# Patient Record
Sex: MALE | Race: OTHER | HISPANIC OR LATINO | URBAN - METROPOLITAN AREA
[De-identification: names, ages, dates, MRNs, and addresses within clinical notes are randomized per-mention and may not be internally consistent; named-entity substitution may affect disease eponyms.]

---

## 2021-05-24 ENCOUNTER — HOSPITAL ENCOUNTER (EMERGENCY)
Facility: HOSPITAL | Age: 56
Discharge: HOME | End: 2021-05-24
Attending: EMERGENCY MEDICINE

## 2021-05-24 ENCOUNTER — APPOINTMENT (EMERGENCY)
Dept: RADIOLOGY | Facility: HOSPITAL | Age: 56
End: 2021-05-24

## 2021-05-24 VITALS
WEIGHT: 300.71 LBS | SYSTOLIC BLOOD PRESSURE: 182 MMHG | OXYGEN SATURATION: 99 % | DIASTOLIC BLOOD PRESSURE: 105 MMHG | RESPIRATION RATE: 14 BRPM | TEMPERATURE: 98.4 F | HEART RATE: 69 BPM

## 2021-05-24 DIAGNOSIS — R03.0 ELEVATED BLOOD PRESSURE READING: ICD-10-CM

## 2021-05-24 DIAGNOSIS — L60.0 INGROWN TOENAIL: Primary | ICD-10-CM

## 2021-05-24 LAB
ANION GAP SERPL CALCULATED.3IONS-SCNC: 10 MMOL/L (ref 4–13)
BASOPHILS # BLD AUTO: 0.03 THOUSANDS/ΜL (ref 0–0.1)
BASOPHILS NFR BLD AUTO: 1 % (ref 0–1)
BUN SERPL-MCNC: 16 MG/DL (ref 5–25)
CALCIUM SERPL-MCNC: 9 MG/DL (ref 8.3–10.1)
CHLORIDE SERPL-SCNC: 103 MMOL/L (ref 100–108)
CO2 SERPL-SCNC: 28 MMOL/L (ref 21–32)
CREAT SERPL-MCNC: 0.78 MG/DL (ref 0.6–1.3)
EOSINOPHIL # BLD AUTO: 0.11 THOUSAND/ΜL (ref 0–0.61)
EOSINOPHIL NFR BLD AUTO: 2 % (ref 0–6)
ERYTHROCYTE [DISTWIDTH] IN BLOOD BY AUTOMATED COUNT: 13.2 % (ref 11.6–15.1)
GFR SERPL CREATININE-BSD FRML MDRD: 100 ML/MIN/1.73SQ M
GLUCOSE SERPL-MCNC: 114 MG/DL (ref 65–140)
HCT VFR BLD AUTO: 40.9 % (ref 36.5–49.3)
HGB BLD-MCNC: 13.3 G/DL (ref 12–17)
IMM GRANULOCYTES # BLD AUTO: 0.02 THOUSAND/UL (ref 0–0.2)
IMM GRANULOCYTES NFR BLD AUTO: 0 % (ref 0–2)
LYMPHOCYTES # BLD AUTO: 1.48 THOUSANDS/ΜL (ref 0.6–4.47)
LYMPHOCYTES NFR BLD AUTO: 24 % (ref 14–44)
MCH RBC QN AUTO: 31 PG (ref 26.8–34.3)
MCHC RBC AUTO-ENTMCNC: 32.5 G/DL (ref 31.4–37.4)
MCV RBC AUTO: 95 FL (ref 82–98)
MONOCYTES # BLD AUTO: 0.74 THOUSAND/ΜL (ref 0.17–1.22)
MONOCYTES NFR BLD AUTO: 12 % (ref 4–12)
NEUTROPHILS # BLD AUTO: 3.81 THOUSANDS/ΜL (ref 1.85–7.62)
NEUTS SEG NFR BLD AUTO: 61 % (ref 43–75)
NRBC BLD AUTO-RTO: 0 /100 WBCS
PLATELET # BLD AUTO: 266 THOUSANDS/UL (ref 149–390)
PMV BLD AUTO: 9.4 FL (ref 8.9–12.7)
POTASSIUM SERPL-SCNC: 4.1 MMOL/L (ref 3.5–5.3)
RBC # BLD AUTO: 4.29 MILLION/UL (ref 3.88–5.62)
SODIUM SERPL-SCNC: 141 MMOL/L (ref 136–145)
WBC # BLD AUTO: 6.19 THOUSAND/UL (ref 4.31–10.16)

## 2021-05-24 PROCEDURE — 11730 AVULSION NAIL PLATE SIMPLE 1: CPT | Performed by: STUDENT IN AN ORGANIZED HEALTH CARE EDUCATION/TRAINING PROGRAM

## 2021-05-24 PROCEDURE — 80048 BASIC METABOLIC PNL TOTAL CA: CPT | Performed by: PHYSICIAN ASSISTANT

## 2021-05-24 PROCEDURE — 73630 X-RAY EXAM OF FOOT: CPT

## 2021-05-24 PROCEDURE — 99285 EMERGENCY DEPT VISIT HI MDM: CPT | Performed by: PHYSICIAN ASSISTANT

## 2021-05-24 PROCEDURE — 11719 TRIM NAIL(S) ANY NUMBER: CPT | Performed by: STUDENT IN AN ORGANIZED HEALTH CARE EDUCATION/TRAINING PROGRAM

## 2021-05-24 PROCEDURE — 36415 COLL VENOUS BLD VENIPUNCTURE: CPT | Performed by: PHYSICIAN ASSISTANT

## 2021-05-24 PROCEDURE — 85025 COMPLETE CBC W/AUTO DIFF WBC: CPT | Performed by: PHYSICIAN ASSISTANT

## 2021-05-24 PROCEDURE — 99283 EMERGENCY DEPT VISIT LOW MDM: CPT

## 2021-05-24 RX ORDER — LIDOCAINE HYDROCHLORIDE 20 MG/ML
5 INJECTION, SOLUTION EPIDURAL; INFILTRATION; INTRACAUDAL; PERINEURAL ONCE
Status: COMPLETED | OUTPATIENT
Start: 2021-05-24 | End: 2021-05-24

## 2021-05-24 RX ORDER — BACITRACIN, NEOMYCIN, POLYMYXIN B 400; 3.5; 5 [USP'U]/G; MG/G; [USP'U]/G
1 OINTMENT TOPICAL 2 TIMES DAILY
Status: DISCONTINUED | OUTPATIENT
Start: 2021-05-24 | End: 2021-05-24 | Stop reason: HOSPADM

## 2021-05-24 RX ORDER — CEPHALEXIN 500 MG/1
500 CAPSULE ORAL EVERY 6 HOURS SCHEDULED
Qty: 20 CAPSULE | Refills: 0 | Status: SHIPPED | OUTPATIENT
Start: 2021-05-24 | End: 2021-05-29

## 2021-05-24 RX ADMIN — LIDOCAINE HYDROCHLORIDE 5 ML: 20 INJECTION, SOLUTION EPIDURAL; INFILTRATION; INTRACAUDAL; PERINEURAL at 17:27

## 2021-05-24 RX ADMIN — BACITRACIN ZINC, NEOMYCIN, POLYMYXIN B SULFAT 1 SMALL APPLICATION: 5000; 3.5; 4 OINTMENT TOPICAL at 17:26

## 2021-05-24 NOTE — PROGRESS NOTES
Procedure  Nail removal    Date/Time: 5/24/2021 5:58 PM  Performed by: Martin Cross DPM  Authorized by: Odessa Rosa DPM     Patient location:  EDUniversal Protocol:  Consent: Verbal consent obtained  Risks and benefits: risks, benefits and alternatives were discussed  Consent given by: patient  Patient understanding: patient states understanding of the procedure being performed  Radiology Images displayed and confirmed  If images not available, report reviewed: imaging studies available  Patient identity confirmed: verbally with patient      Location:     Foot:  L big toe  Pre-procedure details:     Skin preparation:  Betadine  Anesthesia (see MAR for exact dosages): Anesthesia method:  Local infiltration    Local anesthetic:  Lidocaine 2% w/o epi (6 mL)  Nail Removal:     Nail removed:  Complete    Nail bed sutured: no    Ingrown nail:     Wedge excision of skin: no      Nail matrix removed or ablated:  None  Nails trimmed:     Number of nails trimmed:  0  Post-procedure details:     Dressing:  Antibiotic ointment, 4x4 sterile gauze, petrolatum-impregnated gauze and gauze roll    Patient tolerance of procedure:   Tolerated well, no immediate complications

## 2021-05-24 NOTE — ED PROVIDER NOTES
History  Chief Complaint   Patient presents with    Nail Problem     pt c/o left big toe infection ongoing for a while hasnt been seen due to no insurance  66-year-old male with no past medical history presents to the emergency department for evaluation of left great toe infection  Patient reports the left great toe has been intermittently swollen, erythematous, with purulent drainage to both sides of the nails  He states that this infection has been present for approximately 1 year, the amount of drainage and redness and swelling have waxed and waned  Patient reports attempting to self treat at home, with Epsom salts, and removing parts of the toenails  Patient reports insurance issues as a barriers to why he has not sought care for this  Patient also reports he has not seen a primary care physician in approximately 20 years  History provided by:  Medical records and patient   used: No        None       History reviewed  No pertinent past medical history  History reviewed  No pertinent surgical history  History reviewed  No pertinent family history  I have reviewed and agree with the history as documented  E-Cigarette/Vaping    E-Cigarette Use Never User      E-Cigarette/Vaping Substances     Social History     Tobacco Use    Smoking status: Never Smoker    Smokeless tobacco: Never Used   Substance Use Topics    Alcohol use: Yes     Comment: social     Drug use: Never       Review of Systems   Constitutional: Negative for chills and fever  Respiratory: Negative for chest tightness and shortness of breath  Cardiovascular: Negative for chest pain  Gastrointestinal: Negative for nausea and vomiting  Musculoskeletal: Positive for arthralgias and joint swelling  Skin: Positive for color change and wound  Neurological: Negative for dizziness and weakness  All other systems reviewed and are negative        Physical Exam  Physical Exam  Vitals signs and nursing note reviewed  Constitutional:       General: He is not in acute distress  Appearance: He is well-developed  He is not ill-appearing or toxic-appearing  HENT:      Head: Normocephalic and atraumatic  Right Ear: Hearing and external ear normal       Left Ear: Hearing and external ear normal    Eyes:      Conjunctiva/sclera: Conjunctivae normal    Neck:      Vascular: No JVD  Trachea: No tracheal deviation  Cardiovascular:      Rate and Rhythm: Normal rate and regular rhythm  Pulses:           Dorsalis pedis pulses are 2+ on the right side  Posterior tibial pulses are 2+ on the right side  Heart sounds: Normal heart sounds, S1 normal and S2 normal    Pulmonary:      Effort: Pulmonary effort is normal       Breath sounds: Normal breath sounds  No decreased breath sounds, wheezing, rhonchi or rales  Abdominal:      Tenderness: There is no abdominal tenderness  There is no guarding or rebound  Musculoskeletal:      Left foot: Normal range of motion and normal capillary refill  Tenderness and swelling present  No bony tenderness, crepitus, deformity or laceration  Feet:    Skin:     General: Skin is warm and dry  Findings: Wound present  No rash  Neurological:      Mental Status: He is alert and oriented to person, place, and time  GCS: GCS eye subscore is 4  GCS verbal subscore is 5  GCS motor subscore is 6     Psychiatric:         Mood and Affect: Mood normal          Speech: Speech normal          Vital Signs  ED Triage Vitals [05/24/21 1456]   Temperature Pulse Respirations Blood Pressure SpO2   98 4 °F (36 9 °C) 70 18 168/87 97 %      Temp Source Heart Rate Source Patient Position - Orthostatic VS BP Location FiO2 (%)   Oral Monitor Sitting Right arm --      Pain Score       7           Vitals:    05/24/21 1456 05/24/21 1706   BP: 168/87 (!) 182/105   Pulse: 70 69   Patient Position - Orthostatic VS: Sitting          Visual Acuity      ED Medications  Medications   lidocaine (PF) (XYLOCAINE-MPF) 2 % injection 5 mL (5 mL Infiltration Given by Other 5/24/21 1727)       Diagnostic Studies  Results Reviewed     Procedure Component Value Units Date/Time    Basic metabolic panel [[id number]] Collected: 05/24/21 1639    Lab Status: Final result Specimen: Blood from Arm, Right Updated: 05/24/21 1656     Sodium 141 mmol/L      Potassium 4 1 mmol/L      Chloride 103 mmol/L      CO2 28 mmol/L      ANION GAP 10 mmol/L      BUN 16 mg/dL      Creatinine 0 78 mg/dL      Glucose 114 mg/dL      Calcium 9 0 mg/dL      eGFR 100 ml/min/1 73sq m     Narrative:      National Kidney Disease Foundation guidelines for Chronic Kidney Disease (CKD):     Stage 1 with normal or high GFR (GFR > 90 mL/min/1 73 square meters)    Stage 2 Mild CKD (GFR = 60-89 mL/min/1 73 square meters)    Stage 3A Moderate CKD (GFR = 45-59 mL/min/1 73 square meters)    Stage 3B Moderate CKD (GFR = 30-44 mL/min/1 73 square meters)    Stage 4 Severe CKD (GFR = 15-29 mL/min/1 73 square meters)    Stage 5 End Stage CKD (GFR <15 mL/min/1 73 square meters)  Note: GFR calculation is accurate only with a steady state creatinine    CBC and differential [[id number]] Collected: 05/24/21 1639    Lab Status: Final result Specimen: Blood from Arm, Right Updated: 05/24/21 1644     WBC 6 19 Thousand/uL      RBC 4 29 Million/uL      Hemoglobin 13 3 g/dL      Hematocrit 40 9 %      MCV 95 fL      MCH 31 0 pg      MCHC 32 5 g/dL      RDW 13 2 %      MPV 9 4 fL      Platelets 569 Thousands/uL      nRBC 0 /100 WBCs      Neutrophils Relative 61 %      Immat GRANS % 0 %      Lymphocytes Relative 24 %      Monocytes Relative 12 %      Eosinophils Relative 2 %      Basophils Relative 1 %      Neutrophils Absolute 3 81 Thousands/µL      Immature Grans Absolute 0 02 Thousand/uL      Lymphocytes Absolute 1 48 Thousands/µL      Monocytes Absolute 0 74 Thousand/µL      Eosinophils Absolute 0 11 Thousand/µL      Basophils Absolute 0 03 Thousands/µL                  XR foot 3+ vw left   Final Result by Aria Alejandro MD (05/25 0820)      No acute osseous abnormality  Workstation performed: [id number]                    Procedures  Procedures         ED Course  ED Course as of May 26 0117   Mon May 24, 2021   1628 [place] text to podiatry resident      [phone] resident reports she will come see patient; xrays ordered  Consult placed      1650 WBC: 6 19   1650 Hemoglobin: 13 3   1650 HCT: 40 9   1650 Platelet Count: 984   1704 Sodium: 141   1704 Creatinine: 0 78   1704 Glucose, Random: 114   1707 Blood Pressure(!): 182/105   1707 Pulse: 69   1707 Respirations: 14   1707 SpO2: 99 %   1730 Discussed labs with patient  Podiatry resident bedside, will perform incision and drainage, and nail removal       1731 Blood Pressure: 168/87   1731 Blood Pressure(!): 182/105                             SBIRT 22yo+      Most Recent Value   SBIRT (22 yo +)   In order to provide better care to our patients, we are screening all of our patients for alcohol and drug use  Would it be okay to ask you these screening questions? No Filed at: 05/24/2021 1541                    MDM  Number of Diagnoses or Management Options  Elevated blood pressure reading:   Ingrown toenail:   Diagnosis management comments: 47-year-old male with unknown past medical history presents with left great toe pain  Due to the appearance of the toe today, as well as the lack of medical care, labs were performed  CBC, BMP unremarkable  Podiatry was consulted, who performed bedside nail removal   He was discharged the patient home with Keflex, and podiatry follow-up  While the patient has been hypertensive in the emergency department, there is no evidence of hypertensive emergency, no evidence of end-organ damage on exam or per history and I do not believe the patient requires emergency lowering of their blood pressure   I believe that it is reasonable for the patient to be discharged and have outpatient follow up for reassessment of their blood pressure, and if it is still elevated, for additional decisions regarding their management to be made by a primary care physician  The management plan was discussed in detail with the patient at bedside and all questions were answered  The prior to discharge, we provided both verbal and written instructions  We discussed with the patient the signs and symptoms for which to return to the emergency department  All questions were answered and patient was comfortable with the plan of care and discharged to home  Instructed the patient to follow up with the primary care provider and/or special as provided and their written instructions  The patient verbalized understanding of our discussion and plan of care, and agrees to return to the Emergency Department for concerns and progression of illness  Amount and/or Complexity of Data Reviewed  Clinical lab tests: ordered and reviewed  Discuss the patient with other providers: yes (Podiatry Resident Dr Johnny Ramos)        Disposition  Final diagnoses:   Ingrown toenail   Elevated blood pressure reading     Time reflects when diagnosis was documented in both MDM as applicable and the Disposition within this note     Time User Action Codes Description Comment    5/24/2021  5:45 PM Joelle Jefferson Add [L60 0] Ingrown toenail     5/24/2021  5:46 PM Sulaiman Ennis 4, 18[facility] [R03 0] Elevated blood pressure reading       ED Disposition     ED Disposition Condition Date/Time Comment    Discharge Stable Mon May 24, 2021  5:57 PM Julia Berry discharge to home/self care              Follow-up Information     Follow up With Specialties Details Why Contact Info Additional [address] [facility] Schedule an appointment as soon as possible for a visit in 3 days Establish care with PCP, follow-up for high blood pressure reading  [address] [facility], [place], [place], [address] [facility] Emergency Department Emergency Medicine Go to  If symptoms worsen [facility] [address]  [address] [facility] Emergency Department, [address] [place], [place], [address] Multidisciplinary Schedule an appointment as soon as possible for a visit in 2 week(s) make appointment with Podiatry in 2 weeks to follow up  [address] [facility] [address]  [facility], [address] [facility] [address], [place], [place], [address]   Tadeo Cope [facility] [address], DPM Podiatry, [address] [facility]  Follow up  [address] [address]  [phone]             Discharge Medication List as of 5/24/2021  5:57 PM      START taking these medications    Details   cephalexin (KEFLEX) 500 mg capsule Take 1 capsule (500 mg total) by mouth every 6 (six) hours for 5 days, Starting Mon 5/24/2021, Until Sat 5/29/2021, Normal           No discharge procedures on file      PDMP Review     None          ED Provider  Electronically Signed by           Anish Pryor PA-C  05/26/21 0117

## 2021-05-24 NOTE — CONSULTS
[facility] - Consultation    Patient Information:   Wilfrido Herrmann 60 y o  male MRN: [id number]  Unit/Bed#: ED 01 Encounter: [id number]  PCP: No primary care provider on file  Date of Admission:  5/24/2021  Date of Consultation: 05/24/21  Requesting Physician: No att  providers found      ASSESSMENT:    Wilfrido Herrmann is a 60 y o  male with:    1  Onychocryptosis, Let hallux    PLAN:    · Performed total nail avulsion at bedside  See procedure note for details  · Post-procedure care instructions provided  Please refer to AVS    · Left foot xray reviewed  No overt signs of soft tissue gas or OM  Final read pending  · Prescribed 5 days Keflex 500mg q6hrs  · Educated patient on importance of proper foot hygiene  · Educated patient on importance of establishing care for routine appointments with family medicine  · Follow up with Dr Ursula Perry or podiatry at [facility] for continued care  SUBJECTIVE    History of Present Illness:    Wilfrido Herrmann is a 60 y o  male with unknown past medical history who presents with ingrown nail of left big toe  Patient states that nail has been ingrown for > 1 year  He has previously attempt self-treatment due to being uninsured  He has trimmed nail and soaked nail in water with epsom salt  He states that he has noted pus from nail corners  He reports severe pain from lateral nail margin  He works in a warehouse for several hours standing  He has not seen family doctor or podiatrist  He has not taken oral antibiotics  Denies n/v/f/sob/cp  Review of Systems:    Constitutional: Negative  HENT: Negative  Eyes: Negative  Respiratory: Negative  Cardiovascular: Negative  Gastrointestinal: Negative  Musculoskeletal: negative   Skin:ingrown nail   Neurological: negative   Psych: Negative  Past Medical and Surgical History:     History reviewed  No pertinent past medical history  History reviewed   No pertinent surgical history  Meds/Allergies:    (Not in a hospital admission)      No Known Allergies    Social History:     Marital Status: Single    Substance Use History:   Social History     Substance and Sexual Activity   Alcohol Use Yes    Comment: social      Social History     Tobacco Use   Smoking Status Never Smoker   Smokeless Tobacco Never Used     Social History     Substance and Sexual Activity   Drug Use Never       Family History:    History reviewed  No pertinent family history  OBJECTIVE:    Vitals:   Blood Pressure: (!) 182/105 (05/24/21 1706)  Pulse: 69 (05/24/21 1706)  Temperature: 98 4 °F (36 9 °C) (05/24/21 1456)  Temp Source: Oral (05/24/21 1456)  Respirations: 14 (05/24/21 1706)  Weight - Scale: (!) 136 kg (300 lb 11 3 oz) (05/24/21 1456)  SpO2: 99 % (05/24/21 1706)    Physical Exam:     General Appearance: Alert, cooperative, no distress  HEENT: Head normocephalic, atraumatic, without obvious abnormality  Heart: Normal rate and rhythm  Lungs: Non-labored breathing  No respiratory distress  Abdomen: Without distension  Psychiatric: AAOx3  Lower Extremity:  Vascular:   DP/PT pedal pulses on the left are present  DP/PT pedal pulses on the right are present  CRT brisk at the digits  Pedal hair is present  3+ edema noted at bilateral lower extremities  Skin temperature is within normal limits bilaterally  Musculoskeletal:  MMT is 5/5 in all muscle compartments bilaterally  Passive ROM at the 1st MPJ and ankle joint are Decreased bilaterally with the leg extended  Active ROM at the lesser digits is intact  Pain on palpation of left hallux, especially at lateral nail margin plantarly  Dermatological:  Medial and lateral margins of left hallucal nail are edematous, erythematous, with purulent drainage  Hypergranulation tissue noted at the margins  Malodor noted  Nail plate incurvated at medial and lateral margins  Toenails x10 elongated  Neurological:  Gross sensation is intact  Protective sensation is intact  Patient Denies numbness and/or paresthesias  Clinical Images 05/24/21:  See images in chart    Additional Data:     Lab Results: I have personally reviewed pertinent labs including:    Results from last 7 days   Lab Units 05/24/21  1639   WBC Thousand/uL 6 19   HEMOGLOBIN g/dL 13 3   HEMATOCRIT % 40 9   PLATELETS Thousands/uL 266   NEUTROS PCT % 61   LYMPHS PCT % 24   MONOS PCT % 12   EOS PCT % 2     Results from last 7 days   Lab Units 05/24/21  1639   POTASSIUM mmol/L 4 1   CHLORIDE mmol/L 103   CO2 mmol/L 28   BUN mg/dL 16   CREATININE mg/dL 0 78   CALCIUM mg/dL 9 0           Cultures: I have personally reviewed pertinent cultures including:              Imaging: I have personally reviewed pertinent films in PACS  EKG, Pathology, and Other Studies: I have personally reviewed pertinent reports  ** Please Note: Portions of the record may have been created with voice recognition software  Occasional wrong word or "sound a like" substitutions may have occurred due to the inherent limitations of voice recognition software  Read the chart carefully and recognize, using context, where substitutions have occurred   **